# Patient Record
(demographics unavailable — no encounter records)

---

## 2024-10-25 NOTE — DISCUSSION/SUMMARY
[de-identified] : Left knee injury pop and swelling she has difficulty with range of motion significant tenderness.  Will obtain an MRI to further evaluate she will follow-up MRI.  Given prescription and placed in a short hinged knee sleeve today which is medically necessary to support her knee at this time she will follow-up after MRI

## 2024-10-25 NOTE — HISTORY OF PRESENT ILLNESS
[de-identified] : 12yo female presents with mother complaining of left knee pain for 4 days.  She goes to [employer].  She was playing soccer sustained an injury with her leg fully extended and her entire body weight went through it.  She reported a pop in her knee with swelling started shortly after.  Initially she had difficulty bending her knee.  She still does but improving slightly.  The  at school looked at it recommend orthopedic evaluation.  She reports pain with walking with a limp.  Pain is anterior medial aspect of the knee.  She reports locking and buckling.  Denies numbness tingling  The patient's past medical history, past surgical history, medications, allergies, and social history were reviewed by me today with the patient and documented accordingly. In addition, the patient's family history, which is noncontributory to this visit, was also reviewed.

## 2024-10-25 NOTE — PHYSICAL EXAM
[de-identified] : General Exam  Well developed, well nourished No apparent distress Oriented to person, place, and time Mood: Normal Affect: Normal Balance and coordination: Normal Gait: Normal  left knee exam  Skin: Clean, dry, intact Inspection: No obvious malalignment, no masses, + swelling, mild effusion. Tenderness: + MJLT. No LJLT. No tenderness over the medial and lateral patella facets. No ttp medial/lateral epicondyle, patella tendon, tibial tubercle, pes anserinus, or proximal fibula. ROM: 0 to 60 + pain with deep flexion Stability: Stable to varus, valgus, lachman testing. Negative anterior/posterior drawer. Additional tests: +McMurrays test, +patellar grind test.  Strength: 5/5 Q/H/TA/GS/EHL, no atrophy Neuro: In tact to light touch throughout in dp/sp/tib/joseph/saph nerve districutions, DTR's normal Pulses: 2+ DP/PT pulses. [de-identified] :  The following radiographs were ordered and read by me during this patients visit. I reviewed each radiograph in detail with the patient and discussed the findings as highlighted below.   3 views left knee obtained today joint spaces well-maintained normal alignment no fracture closing growth plates

## 2025-03-26 NOTE — HISTORY OF PRESENT ILLNESS
[de-identified] : 14-year-old female she had initial knee injury back in October this is her first follow-up visit since that time she had an MRI which did show bone contusion to the anterior lateral tibial plateau she has not done any physical therapy rehab or follow-up with her  at school this is her first visit since she is still wearing a knee brace and reports that her knee hurts she wanted to start spring track but had pain with running this is her first follow-up since the injury 6 months ago  The patient's past medical history, past surgical history, medications, allergies, and social history were reviewed by me today with the patient and documented accordingly. In addition, the patient's family history, which is noncontributory to this visit, was also reviewed.

## 2025-03-26 NOTE — PHYSICAL EXAM
[de-identified] : General Exam  Well developed, well nourished No apparent distress Oriented to person, place, and time Mood: Normal Affect: Normal Balance and coordination: Normal Gait: Normal  left knee exam  Skin: Clean, dry, intact Inspection: No obvious malalignment, no masses, + swelling, mild effusion. Tenderness: - MJLT. No LJLT. No tenderness over the medial and lateral patella facets. No ttp medial/lateral epicondyle, patella tendon, tibial tubercle, pes anserinus, or proximal fibula. ROM: 0 to 60 + pain with deep flexion Stability: Stable to varus, valgus, lachman testing. Negative anterior/posterior drawer. Additional tests: -McMurrays test, +patellar grind test.  Strength: 4/5 Q 5/5 H/TA/GS/EHL, no atrophy Neuro: In tact to light touch throughout in dp/sp/tib/joseph/saph nerve districutions, DTR's normal Pulses: 2+ DP/PT pulses. [de-identified] : EXAM: [id number] - MR KNEE LT  - ORDERED BY: PAMELA DAVILA   PROCEDURE DATE:  11/05/2024    INTERPRETATION:  CLINICAL INDICATION: Soccer injury. Knee pain.  MRI of the left knee without contrast   FINDINGS:   OSSEOUS STRUCTURES: Subcortical trabecular fracture with horizontal hypointense signal with surrounding edema within the anterior/outer aspect of the lateral tibial plateau. No depression. Remaining marrow is preserved.  MEDIAL COMPARTMENT: The medial meniscus is intact. The articular cartilage is preserved.  LATERAL COMPARTMENT: The lateral meniscus is intact. The articular cartilage is preserved.  PATELLOFEMORAL COMPARTMENT: The articular cartilage is preserved. The patellar retinacula are intact.  CRUCIATE LIGAMENTS: Intact.  MEDIAL AND LATERAL COLLATERAL LIGAMENTS: The medial collateral ligament is intact. The iliotibial band, fibular collateral ligament, biceps femoris tendon, and popliteus tendon are intact.  Small amount of fluid about the distal semimembranosus.  EXTENSOR MECHANISM: The extensor mechanism is intact.  PROXIMAL TIBIOFIBULAR JOINT: The proximal tibiofibular joint is intact.  SYNOVIUM: There is no joint effusion.  Mild edema within the superolateral aspect of Hoffa's fat.  POPLITEAL FOSSA: There is no Baker's cyst.  NERVES: The visualized nerves are preserved.  MUSCLES: Muscles are intact.  SUBCUTANEOUS TISSUES: Normal.  IMPRESSION: Trabecular fracture of the anterior/outer aspect of the lateral tibial plateau. Intact anterior cruciate ligament.  --- End of Report ---

## 2025-03-26 NOTE — DISCUSSION/SUMMARY
[de-identified] : Left knee bone contusion she has significant patellofemoral pain quadriceps core weakness at this time she has difficulty with straight leg raising.  Recommend a course of physical therapy she will follow-up in 2 months if not improved consider repeat imaging.  All questions answered